# Patient Record
Sex: MALE | Race: WHITE | NOT HISPANIC OR LATINO
[De-identification: names, ages, dates, MRNs, and addresses within clinical notes are randomized per-mention and may not be internally consistent; named-entity substitution may affect disease eponyms.]

---

## 2019-05-17 RX ORDER — ZOSTER VACCINE RECOMBINANT, ADJUVANTED 50 MCG/0.5
50 KIT INTRAMUSCULAR
Qty: 1 | Refills: 0 | Status: ACTIVE | COMMUNITY
Start: 2019-05-17

## 2019-05-20 ENCOUNTER — RECORD ABSTRACTING (OUTPATIENT)
Age: 79
End: 2019-05-20

## 2019-05-20 DIAGNOSIS — Z78.9 OTHER SPECIFIED HEALTH STATUS: ICD-10-CM

## 2019-05-20 DIAGNOSIS — Z23 ENCOUNTER FOR IMMUNIZATION: ICD-10-CM

## 2019-05-20 DIAGNOSIS — Z82.49 FAMILY HISTORY OF ISCHEMIC HEART DISEASE AND OTHER DISEASES OF THE CIRCULATORY SYSTEM: ICD-10-CM

## 2019-05-20 DIAGNOSIS — R94.39 ABNORMAL RESULT OF OTHER CARDIOVASCULAR FUNCTION STUDY: ICD-10-CM

## 2019-05-20 DIAGNOSIS — Z87.891 PERSONAL HISTORY OF NICOTINE DEPENDENCE: ICD-10-CM

## 2019-05-20 DIAGNOSIS — Z87.2 PERSONAL HISTORY OF DISEASES OF THE SKIN AND SUBCUTANEOUS TISSUE: ICD-10-CM

## 2019-05-20 DIAGNOSIS — Z86.39 PERSONAL HISTORY OF OTHER ENDOCRINE, NUTRITIONAL AND METABOLIC DISEASE: ICD-10-CM

## 2019-05-20 DIAGNOSIS — Z87.898 PERSONAL HISTORY OF OTHER SPECIFIED CONDITIONS: ICD-10-CM

## 2019-05-20 DIAGNOSIS — R10.32 LEFT LOWER QUADRANT PAIN: ICD-10-CM

## 2019-05-20 DIAGNOSIS — Z86.79 PERSONAL HISTORY OF OTHER DISEASES OF THE CIRCULATORY SYSTEM: ICD-10-CM

## 2019-05-20 DIAGNOSIS — R01.1 CARDIAC MURMUR, UNSPECIFIED: ICD-10-CM

## 2019-05-20 RX ORDER — ASPIRIN 81 MG
81 TABLET, DELAYED RELEASE (ENTERIC COATED) ORAL DAILY
Refills: 0 | Status: ACTIVE | COMMUNITY

## 2019-05-20 RX ORDER — CHROMIUM 200 MCG
1000 TABLET ORAL
Refills: 0 | Status: ACTIVE | COMMUNITY

## 2019-05-20 RX ORDER — RAMIPRIL 10 MG/1
10 CAPSULE ORAL DAILY
Refills: 0 | Status: ACTIVE | COMMUNITY

## 2019-05-20 RX ORDER — DIAZEPAM 5 MG/1
5 TABLET ORAL
Refills: 0 | Status: ACTIVE | COMMUNITY

## 2019-07-17 ENCOUNTER — APPOINTMENT (OUTPATIENT)
Dept: CARDIOLOGY | Facility: CLINIC | Age: 79
End: 2019-07-17
Payer: MEDICARE

## 2019-07-17 ENCOUNTER — NON-APPOINTMENT (OUTPATIENT)
Age: 79
End: 2019-07-17

## 2019-07-17 VITALS
DIASTOLIC BLOOD PRESSURE: 63 MMHG | HEIGHT: 70 IN | BODY MASS INDEX: 23.05 KG/M2 | HEART RATE: 50 BPM | OXYGEN SATURATION: 98 % | RESPIRATION RATE: 16 BRPM | SYSTOLIC BLOOD PRESSURE: 127 MMHG | WEIGHT: 161 LBS

## 2019-07-17 PROCEDURE — 93000 ELECTROCARDIOGRAM COMPLETE: CPT

## 2019-07-17 PROCEDURE — 99204 OFFICE O/P NEW MOD 45 MIN: CPT

## 2019-07-21 RX ORDER — METOPROLOL SUCCINATE 25 MG/1
25 TABLET, EXTENDED RELEASE ORAL DAILY
Refills: 3 | Status: ACTIVE | COMMUNITY
Start: 2019-07-21

## 2019-07-21 RX ORDER — METOPROLOL TARTRATE 25 MG/1
25 TABLET, FILM COATED ORAL DAILY
Refills: 0 | Status: DISCONTINUED | COMMUNITY
End: 2019-07-21

## 2019-07-21 NOTE — PHYSICAL EXAM
[Well Groomed] : well groomed [General Appearance - In No Acute Distress] : no acute distress [Normal Conjunctiva] : the conjunctiva exhibited no abnormalities [Eyelids - No Xanthelasma] : the eyelids demonstrated no xanthelasmas [Normal Oral Mucosa] : normal oral mucosa [No Oral Pallor] : no oral pallor [No Oral Cyanosis] : no oral cyanosis [Normal Jugular Venous A Waves Present] : normal jugular venous A waves present [Normal Jugular Venous V Waves Present] : normal jugular venous V waves present [No Jugular Venous Dubois A Waves] : no jugular venous dubois A waves [Respiration, Rhythm And Depth] : normal respiratory rhythm and effort [Exaggerated Use Of Accessory Muscles For Inspiration] : no accessory muscle use [Auscultation Breath Sounds / Voice Sounds] : lungs were clear to auscultation bilaterally [Normal Rate] : normal [Rhythm Regular] : regular [Normal S1] : normal S1 [Normal S2] : normal S2 [No Gallop] : no gallop heard [No Murmur] : no murmurs heard [2+] : left 2+ [No Pitting Edema] : no pitting edema present [Abdomen Soft] : soft [Abdomen Tenderness] : non-tender [Abdomen Mass (___ Cm)] : no abdominal mass palpated [Abnormal Walk] : normal gait [Gait - Sufficient For Exercise Testing] : the gait was sufficient for exercise testing [Nail Clubbing] : no clubbing of the fingernails [Cyanosis, Localized] : no localized cyanosis [Petechial Hemorrhages (___cm)] : no petechial hemorrhages [Skin Color & Pigmentation] : normal skin color and pigmentation [] : no rash [No Venous Stasis] : no venous stasis [Skin Lesions] : no skin lesions [No Skin Ulcers] : no skin ulcer [No Xanthoma] : no  xanthoma was observed [Oriented To Time, Place, And Person] : oriented to person, place, and time [Affect] : the affect was normal [Mood] : the mood was normal [No Anxiety] : not feeling anxious [Right Carotid Bruit] : no bruit heard over the right carotid [Left Carotid Bruit] : no bruit heard over the left carotid [Bruit] : no bruit heard

## 2019-07-21 NOTE — DISCUSSION/SUMMARY
[FreeTextEntry1] : 79 year man with a history as listed presents for an initial cardiac evaluation. \par Damien is doing well. He denies any anginal symptoms. Clinically he is euvolemic on exam. His EKG did not reveal any significant ischemic changes. \par Per his records, he had an nuclear stress test unrevealing for ischemia at an outside office in 10/2018. He was able to do 9.7 METS on the Aydin Protocol. \par His blood pressure and heart rate are controlled. He is bradycardic but this appears to be a chronic issue. He was noted to have chronotropic competence on his last stress test. He will continue Toprol 25mg Qday. He will continue Ramipril 10mg Qday. \par He will continue with statin therapy to achieve maintain goal LDL<100 or ideally <70. His last LDL was 97 in 6/2019. He will continue with zocor 40mg HS. Possible switch to Lipitor Crestor in future. \par Exercise and diet counseling was performed in order to reduce her future cardiovascular risk. \par He will followup with me in 2-3 months or sooner if necessary. \par

## 2019-08-19 ENCOUNTER — APPOINTMENT (OUTPATIENT)
Dept: CARDIOLOGY | Facility: CLINIC | Age: 79
End: 2019-08-19

## 2019-10-23 ENCOUNTER — APPOINTMENT (OUTPATIENT)
Dept: CARDIOLOGY | Facility: CLINIC | Age: 79
End: 2019-10-23
Payer: MEDICARE

## 2019-10-23 ENCOUNTER — NON-APPOINTMENT (OUTPATIENT)
Age: 79
End: 2019-10-23

## 2019-10-23 VITALS
WEIGHT: 165 LBS | OXYGEN SATURATION: 100 % | HEIGHT: 70 IN | HEART RATE: 52 BPM | BODY MASS INDEX: 23.62 KG/M2 | DIASTOLIC BLOOD PRESSURE: 62 MMHG | SYSTOLIC BLOOD PRESSURE: 137 MMHG

## 2019-10-23 VITALS — SYSTOLIC BLOOD PRESSURE: 124 MMHG | DIASTOLIC BLOOD PRESSURE: 60 MMHG

## 2019-10-23 DIAGNOSIS — Z00.00 ENCOUNTER FOR GENERAL ADULT MEDICAL EXAMINATION W/OUT ABNORMAL FINDINGS: ICD-10-CM

## 2019-10-23 DIAGNOSIS — M81.0 AGE-RELATED OSTEOPOROSIS W/OUT CURRENT PATHOLOGICAL FRACTURE: ICD-10-CM

## 2019-10-23 PROCEDURE — 99214 OFFICE O/P EST MOD 30 MIN: CPT

## 2019-10-23 PROCEDURE — 93000 ELECTROCARDIOGRAM COMPLETE: CPT

## 2019-10-23 RX ORDER — SIMVASTATIN 40 MG/1
40 TABLET, FILM COATED ORAL DAILY
Refills: 0 | Status: DISCONTINUED | COMMUNITY
End: 2019-10-23

## 2019-10-23 RX ORDER — ALENDRONATE SODIUM 70 MG/1
70 TABLET ORAL
Qty: 12 | Refills: 3 | Status: ACTIVE | COMMUNITY
Start: 2019-10-23

## 2019-10-23 NOTE — DISCUSSION/SUMMARY
[FreeTextEntry1] : 79 year man with a history as listed presents for a followup cardiac evaluation. \par Damien is doing well. He denies any anginal symptoms. Clinically he is euvolemic on exam. His EKG did not reveal any significant ischemic changes. \par Per his records, he had an nuclear stress test unrevealing for ischemia at an outside office in 10/2018. He was able to do 9.7 METS on the Aydin Protocol. \par His blood pressure and heart rate are controlled. He is bradycardic but this appears to be a chronic issue. He was noted to have chronotropic competence on his last stress test. He will continue Toprol 25mg Qday. He will continue Ramipril 10mg Qday. \par He will continue with statin therapy to achieve maintain goal LDL<100 or ideally <70. His last LDL was 97 in 6/2019. I would change his Zocor to Crestor 20mg HS. Recheck lipids in 3 months. \par Exercise and diet counseling was performed in order to reduce her future cardiovascular risk. \par He will followup with me in  3 months or sooner if necessary. \par

## 2019-10-23 NOTE — HISTORY OF PRESENT ILLNESS
[FreeTextEntry1] : 79 year man with history of CAD s/p pLAD s/p BMS 2012, HTN, HLD, BPH, ex tobacco, anxiety. \par \par He is here for a followup visit. \par He is complaining of more back pain. He   denies any chest pain, dyspnea, PND, orthopnea, lower extremity edema, near syncope, syncope. He denies any blurry vision, headaches. He exercises about 5 times a weeks for 35 minutes without any anginal symptoms. \par He is compliant with his medications. \par \par He had an nuclear stress test unrevealing for ischemia at an outside office in 10/2018. He was able to do 9.7 METS on the Aydin Protocol.

## 2019-10-23 NOTE — PHYSICAL EXAM
[General Appearance - In No Acute Distress] : no acute distress [Well Groomed] : well groomed [Normal Conjunctiva] : the conjunctiva exhibited no abnormalities [Eyelids - No Xanthelasma] : the eyelids demonstrated no xanthelasmas [Normal Oral Mucosa] : normal oral mucosa [No Oral Pallor] : no oral pallor [Normal Jugular Venous A Waves Present] : normal jugular venous A waves present [No Oral Cyanosis] : no oral cyanosis [Normal Jugular Venous V Waves Present] : normal jugular venous V waves present [No Jugular Venous Dubois A Waves] : no jugular venous dubois A waves [Exaggerated Use Of Accessory Muscles For Inspiration] : no accessory muscle use [Respiration, Rhythm And Depth] : normal respiratory rhythm and effort [Abdomen Soft] : soft [Auscultation Breath Sounds / Voice Sounds] : lungs were clear to auscultation bilaterally [Abdomen Tenderness] : non-tender [Abdomen Mass (___ Cm)] : no abdominal mass palpated [Abnormal Walk] : normal gait [Gait - Sufficient For Exercise Testing] : the gait was sufficient for exercise testing [Nail Clubbing] : no clubbing of the fingernails [Cyanosis, Localized] : no localized cyanosis [Petechial Hemorrhages (___cm)] : no petechial hemorrhages [Skin Color & Pigmentation] : normal skin color and pigmentation [No Venous Stasis] : no venous stasis [] : no rash [Skin Lesions] : no skin lesions [No Skin Ulcers] : no skin ulcer [No Xanthoma] : no  xanthoma was observed [Oriented To Time, Place, And Person] : oriented to person, place, and time [Affect] : the affect was normal [No Anxiety] : not feeling anxious [Mood] : the mood was normal [Normal Rate] : normal [Rhythm Regular] : regular [Normal S1] : normal S1 [Normal S2] : normal S2 [No Gallop] : no gallop heard [No Murmur] : no murmurs heard [2+] : left 2+ [Right Carotid Bruit] : no bruit heard over the right carotid [Left Carotid Bruit] : no bruit heard over the left carotid [Bruit] : no bruit heard [No Pitting Edema] : no pitting edema present

## 2019-10-25 RX ORDER — ROSUVASTATIN CALCIUM 20 MG/1
20 TABLET, FILM COATED ORAL DAILY
Qty: 30 | Refills: 5 | Status: ACTIVE | COMMUNITY
Start: 2019-10-23 | End: 1900-01-01

## 2019-11-11 ENCOUNTER — APPOINTMENT (OUTPATIENT)
Dept: ORTHOPEDIC SURGERY | Facility: CLINIC | Age: 79
End: 2019-11-11

## 2019-11-19 ENCOUNTER — APPOINTMENT (OUTPATIENT)
Dept: ORTHOPEDIC SURGERY | Facility: CLINIC | Age: 79
End: 2019-11-19
Payer: MEDICARE

## 2019-11-19 VITALS — DIASTOLIC BLOOD PRESSURE: 70 MMHG | HEART RATE: 59 BPM | SYSTOLIC BLOOD PRESSURE: 144 MMHG

## 2019-11-19 VITALS — WEIGHT: 165 LBS | HEIGHT: 70 IN | BODY MASS INDEX: 23.62 KG/M2

## 2019-11-19 PROCEDURE — 99204 OFFICE O/P NEW MOD 45 MIN: CPT

## 2019-11-20 NOTE — HISTORY OF PRESENT ILLNESS
[de-identified] : Patient is here for left knee pain. This has been a chronic issue. He has seen other providers for this issue in the past but overall has done nothing to treat this pain. He recently had xrays which showed end stage arthritis. He is here to discuss what his options are. There has been no recent injury. His pain is progressing and is affecting more ADLs. \par \par The patient's past medical history, past surgical history, medications and allergies were reviewed by me today and documented accordingly. In addition, the patient's family and social history, which were noncontributory to this visit, were reviewed also. The patient has no family history of arthritis.

## 2019-11-20 NOTE — DISCUSSION/SUMMARY
[de-identified] : Discussed findings of today's exam and possible causes of patient's pain.  Educated patient on their most probable diagnosis of chronic left knee pain secondary to severe osteoarthritis most notably of the medial compartment with bone-on-bone degradation.  Reviewed possible courses of treatment, and we collaboratively decided best course of treatment at this time will include conservative management. Patient has long-standing history of worsening knee pain and decreasing overall function, he has severe medial compartment osteoarthritis, however he has not tried very much in the way of conservative management for this issue. After discussing various treatment options patient elected proceed with insurance authorization for hyaluronic acid injection therapy. Patient advised that if he does not have good relief from hyaluronic acid he would be recommended to have a consultation with one of my surgical associates regarding total knee arthroplasty, but he may also have the option of hemiarthroplasty as he primarily has localized medial compartment degeneration, with maintained joint spaces in the patellofemoral and lateral compartments. Surgical decision making would be dependent on surgeon preference and recovery options to be discussed with the patient at that time. Patient is advised that he does not need prior authorization for her hyaluronic acid as he has Medicare part B., but he states he has travel plans for the upcoming holiday, he would like to followup afterwards for hyaluronic acid injection therapy at that time.  Patient appreciates and agrees with current plan.\par \par This note was generated using dragon medical dictation software.  A reasonable effort has been made for proofreading its contents, but typos may still remain.  If there are any questions or points of clarification needed please notify my office.

## 2019-11-20 NOTE — PHYSICAL EXAM
[de-identified] : Constitutional: Well-nourished, well-developed, No acute distress\par Respiratory:  Good respiratory effort, no SOB\par Lymphatic: No regional lymphadenopathy, no lymphedema\par Psychiatric: Pleasant and normal affect, alert and oriented x3\par Skin: Clean dry and intact B/L UE/LE\par Musculoskeletal: normal except where as noted in regional exam\par \par B/L Hips: No asymmetry, malalignment, or swelling, Full ROM, 5/5 strength in flexion/ext, IR/ER, Abd/Add, Joints stable\par B/L Ankles: No asymmetry, malalignment, or swelling, Full ROM, 5/5 strength in DF/PF/Inv/Ev, Joints stable\par \par BIOMECHANICAL EXAM: no marked leg length discrepancy, moderate hip abductor weakness b/l, no marked pes planus or foot pronation, tight hams and ITB b/l.  Normal gait and station\par \par Left Knee:\par APPEARANCE: no marked deformities, no swelling or malalignment\par POSITIVE TENDERNESS:  + crepitus of the anterior knee, and tenderness of patellar retinaculum\par NONTENDER: jt lines b/l, patellar & quadriceps tendons, MCL/LCL, ITB at the lateral femoral condyle & Gerdy's tubercle, pes bursa. \par ROM: full & painless, although some discomfort in deep knee flexion\par RESISTIVE TESTING: + discomfort with knee ext from deep knee flexion (stretched position), painless knee flexion. \par SPECIAL TESTS: stable v/v stress. painless grind. neg Lachman's. neg ant/post drawer. neg Christofer's. neg Thessaly test. neg Luciano's & Malacrae's\par NEURO: Normal sensation of LE, DTRs 2+/4 patella and achilles\par PULSES: 2+ DP/PT pulses\par \par Right Knee:\par APPEARANCE: no marked deformities, no swelling or malalignment\par POSITIVE TENDERNESS:  + crepitus of the anterior knee, and tenderness of patellar retinaculum\par NONTENDER: jt lines b/l, patellar & quadriceps tendons, MCL/LCL, ITB at the lateral femoral condyle & Gerdy's tubercle, pes bursa. \par ROM: full & painless, although some discomfort in deep knee flexion\par RESISTIVE TESTING: + discomfort with knee ext from deep knee flexion (stretched position), painless knee flexion. \par SPECIAL TESTS: stable v/v stress. painless grind. neg Lachman's. neg ant/post drawer. neg Christofer's. neg Thessaly test. neg Luciano's & Malacrae's\par NEURO: Normal sensation of LE, DTRs 2+/4 patella and achilles\par PULSES: 2+ DP/PT pulses\par  [de-identified] : I reviewed and clinically correlated the following outside imaging studies from [facility],\par \par IMPRESSION:\par \par No acute fracture. Severe, end-stage arthrosis within the medial femorotibial compartment with broad region bone on bone apposition, subjacent sclerosis and small marginal osteophyte formation. Genu varus and lateral tibial translation. Relative mild lateral and patellofemoral arthrosis. Small effusion. Atherosclerotic calcifications.\par \par History: Chronic knee pain progressing over the past month\par \par Technique: 3 views left knee\par \par Comparison: None

## 2019-12-23 ENCOUNTER — APPOINTMENT (OUTPATIENT)
Dept: ORTHOPEDIC SURGERY | Facility: CLINIC | Age: 79
End: 2019-12-23

## 2020-01-17 ENCOUNTER — TRANSCRIPTION ENCOUNTER (OUTPATIENT)
Age: 80
End: 2020-01-17

## 2020-03-27 ENCOUNTER — APPOINTMENT (OUTPATIENT)
Dept: CARDIOLOGY | Facility: CLINIC | Age: 80
End: 2020-03-27

## 2020-06-16 ENCOUNTER — NON-APPOINTMENT (OUTPATIENT)
Age: 80
End: 2020-06-16

## 2020-06-16 ENCOUNTER — APPOINTMENT (OUTPATIENT)
Dept: CARDIOLOGY | Facility: CLINIC | Age: 80
End: 2020-06-16
Payer: MEDICARE

## 2020-06-16 VITALS
HEART RATE: 46 BPM | WEIGHT: 161 LBS | BODY MASS INDEX: 23.05 KG/M2 | DIASTOLIC BLOOD PRESSURE: 64 MMHG | SYSTOLIC BLOOD PRESSURE: 130 MMHG | OXYGEN SATURATION: 96 % | HEIGHT: 70 IN

## 2020-06-16 VITALS — SYSTOLIC BLOOD PRESSURE: 118 MMHG | DIASTOLIC BLOOD PRESSURE: 60 MMHG

## 2020-06-16 PROCEDURE — 99214 OFFICE O/P EST MOD 30 MIN: CPT

## 2020-06-16 PROCEDURE — 93000 ELECTROCARDIOGRAM COMPLETE: CPT

## 2020-06-16 NOTE — DISCUSSION/SUMMARY
[FreeTextEntry1] : 79 year man with a history as listed presents for a followup cardiac evaluation. \par Damien is doing well. He denies any anginal symptoms. Clinically he is euvolemic on exam. His EKG did not reveal any significant ischemic changes. \par Per his records, he had an nuclear stress test unrevealing for ischemia at an outside office in 10/2018. He was able to do 9.7 METS on the Aydin Protocol. \par His blood pressure and heart rate are controlled. He is bradycardic but this appears to be a chronic issue. He was noted to have chronotropic competence on his last stress test. He will continue Toprol 25mg Qday. He will continue Valsartan 160mg Qday. \par He will continue with statin therapy to achieve maintain goal LDL<100 or ideally <70.  in 6/2019. He will continue Crestor 20mg HS. \par Exercise and diet counseling was performed in order to reduce her future cardiovascular risk. \par He will followup with me in  3 months or sooner if necessary. \par

## 2020-06-16 NOTE — HISTORY OF PRESENT ILLNESS
[FreeTextEntry1] : 79 year man with history of CAD s/p pLAD s/p BMS 2012, HTN, HLD, BPH, ex tobacco, anxiety. \par \par He is here for a followup visit. \par He has lost about 10lbs which was unexplained over the last few months. He reports that he had labs done that reportedly was normal. He is eating normally. He notes that his weight has stabilized. He is taking a brisk walk about 5 times a week. \par He   denies any chest pain, dyspnea, PND, orthopnea, lower extremity edema, near syncope, syncope. He denies any blurry vision, headaches. \par He is compliant with his medications. \par \par He had an nuclear stress test unrevealing for ischemia at an outside office in 10/2018. He was able to do 9.7 METS on the Aydin Protocol.

## 2020-06-16 NOTE — PHYSICAL EXAM
[Well Groomed] : well groomed [General Appearance - In No Acute Distress] : no acute distress [Normal Conjunctiva] : the conjunctiva exhibited no abnormalities [Eyelids - No Xanthelasma] : the eyelids demonstrated no xanthelasmas [Normal Oral Mucosa] : normal oral mucosa [No Oral Pallor] : no oral pallor [No Oral Cyanosis] : no oral cyanosis [Normal Jugular Venous A Waves Present] : normal jugular venous A waves present [Normal Jugular Venous V Waves Present] : normal jugular venous V waves present [No Jugular Venous Dubois A Waves] : no jugular venous dubois A waves [Respiration, Rhythm And Depth] : normal respiratory rhythm and effort [Exaggerated Use Of Accessory Muscles For Inspiration] : no accessory muscle use [Auscultation Breath Sounds / Voice Sounds] : lungs were clear to auscultation bilaterally [Abdomen Soft] : soft [Abdomen Tenderness] : non-tender [Abdomen Mass (___ Cm)] : no abdominal mass palpated [Abnormal Walk] : normal gait [Gait - Sufficient For Exercise Testing] : the gait was sufficient for exercise testing [Nail Clubbing] : no clubbing of the fingernails [Cyanosis, Localized] : no localized cyanosis [Petechial Hemorrhages (___cm)] : no petechial hemorrhages [Skin Color & Pigmentation] : normal skin color and pigmentation [] : no rash [No Venous Stasis] : no venous stasis [Skin Lesions] : no skin lesions [No Skin Ulcers] : no skin ulcer [No Xanthoma] : no  xanthoma was observed [Affect] : the affect was normal [Oriented To Time, Place, And Person] : oriented to person, place, and time [Mood] : the mood was normal [No Anxiety] : not feeling anxious [Normal Rate] : normal [Normal S1] : normal S1 [Rhythm Regular] : regular [No Murmur] : no murmurs heard [Normal S2] : normal S2 [No Gallop] : no gallop heard [2+] : left 2+ [Left Carotid Bruit] : no bruit heard over the left carotid [Right Carotid Bruit] : no bruit heard over the right carotid [Bruit] : no bruit heard [No Pitting Edema] : no pitting edema present

## 2020-12-16 ENCOUNTER — NON-APPOINTMENT (OUTPATIENT)
Age: 80
End: 2020-12-16

## 2020-12-16 ENCOUNTER — APPOINTMENT (OUTPATIENT)
Dept: CARDIOLOGY | Facility: CLINIC | Age: 80
End: 2020-12-16
Payer: MEDICARE

## 2020-12-16 VITALS
BODY MASS INDEX: 22.05 KG/M2 | SYSTOLIC BLOOD PRESSURE: 117 MMHG | HEIGHT: 70 IN | DIASTOLIC BLOOD PRESSURE: 62 MMHG | HEART RATE: 61 BPM | OXYGEN SATURATION: 100 % | WEIGHT: 154 LBS

## 2020-12-16 PROCEDURE — 99214 OFFICE O/P EST MOD 30 MIN: CPT

## 2020-12-16 PROCEDURE — 93000 ELECTROCARDIOGRAM COMPLETE: CPT

## 2020-12-16 NOTE — PHYSICAL EXAM
[Well Groomed] : well groomed [General Appearance - In No Acute Distress] : no acute distress [Normal Conjunctiva] : the conjunctiva exhibited no abnormalities [Eyelids - No Xanthelasma] : the eyelids demonstrated no xanthelasmas [Normal Oral Mucosa] : normal oral mucosa [No Oral Pallor] : no oral pallor [No Oral Cyanosis] : no oral cyanosis [Normal Jugular Venous A Waves Present] : normal jugular venous A waves present [Normal Jugular Venous V Waves Present] : normal jugular venous V waves present [No Jugular Venous Dubois A Waves] : no jugular venous dubois A waves [Respiration, Rhythm And Depth] : normal respiratory rhythm and effort [Exaggerated Use Of Accessory Muscles For Inspiration] : no accessory muscle use [Auscultation Breath Sounds / Voice Sounds] : lungs were clear to auscultation bilaterally [Abdomen Soft] : soft [Abdomen Tenderness] : non-tender [Abdomen Mass (___ Cm)] : no abdominal mass palpated [Abnormal Walk] : normal gait [Gait - Sufficient For Exercise Testing] : the gait was sufficient for exercise testing [Nail Clubbing] : no clubbing of the fingernails [Cyanosis, Localized] : no localized cyanosis [Petechial Hemorrhages (___cm)] : no petechial hemorrhages [Skin Color & Pigmentation] : normal skin color and pigmentation [] : no rash [No Venous Stasis] : no venous stasis [Skin Lesions] : no skin lesions [No Skin Ulcers] : no skin ulcer [No Xanthoma] : no  xanthoma was observed [Oriented To Time, Place, And Person] : oriented to person, place, and time [Affect] : the affect was normal [Mood] : the mood was normal [No Anxiety] : not feeling anxious [Normal Rate] : normal [Rhythm Regular] : regular [Normal S1] : normal S1 [Normal S2] : normal S2 [No Gallop] : no gallop heard [No Murmur] : no murmurs heard [2+] : left 2+ [Right Carotid Bruit] : no bruit heard over the right carotid [Left Carotid Bruit] : no bruit heard over the left carotid [Bruit] : no bruit heard [No Pitting Edema] : no pitting edema present

## 2020-12-16 NOTE — HISTORY OF PRESENT ILLNESS
[FreeTextEntry1] : 79 year man with history of CAD s/p pLAD s/p BMS 2012, HTN, HLD, BPH, ex tobacco, anxiety. \par \par He is here for a followup visit. \par he contracted COVID with a confirmed diagnosis with PCR on 10/23/20. He  was not hospitalized and given Azithromycin and Dexamethasone. \par He still has a lingering cough. Currently denies dyspnea. He is walking about 2-2.5 miles a day. \par He   denies any chest pain,  PND, orthopnea, lower extremity edema, near syncope, syncope. He denies any blurry vision, headaches. He is eating normally. He notes that his weight has stabilized.  \par He is compliant with his medications. \par \par He had an nuclear stress test unrevealing for ischemia at an outside office in 10/2018. He was able to do 9.7 METS on the Aydin Protocol.

## 2020-12-16 NOTE — DISCUSSION/SUMMARY
[FreeTextEntry1] : 80 year man with a history as listed presents for a followup cardiac evaluation. \par Damien is better after his recent COVID infection.  He denies any anginal symptoms. Clinically he is euvolemic on exam. His EKG did not reveal any significant ischemic changes. He will get a 2d echo to assess for any  new structural heart disease, changes in valvular and ventricular function. \par Per his records, he had an nuclear stress test unrevealing for ischemia at an outside office in 10/2018. He was able to do 9.7 METS on the Aydin Protocol. He will get a nuclear stress in the spring. \par His blood pressure and heart rate are controlled. He is bradycardic but this appears to be a chronic issue. He was noted to have chronotropic competence on his last stress test. He will continue Toprol 25mg Qday. He will continue Valsartan 160mg Qday. \par He will continue with statin therapy to achieve maintain goal LDL<100 or ideally <70.  in 6/2019. He will continue Crestor 20mg HS. \par Exercise and diet counseling was performed in order to reduce her future cardiovascular risk. \par He will followup with me in  3-4 months or sooner if necessary. \par

## 2021-01-08 ENCOUNTER — APPOINTMENT (OUTPATIENT)
Dept: CARDIOLOGY | Facility: CLINIC | Age: 81
End: 2021-01-08
Payer: MEDICARE

## 2021-01-08 PROCEDURE — 93306 TTE W/DOPPLER COMPLETE: CPT

## 2021-01-13 ENCOUNTER — TRANSCRIPTION ENCOUNTER (OUTPATIENT)
Age: 81
End: 2021-01-13

## 2021-06-24 ENCOUNTER — APPOINTMENT (OUTPATIENT)
Dept: CARDIOLOGY | Facility: CLINIC | Age: 81
End: 2021-06-24
Payer: MEDICARE

## 2021-06-24 ENCOUNTER — NON-APPOINTMENT (OUTPATIENT)
Age: 81
End: 2021-06-24

## 2021-06-24 VITALS
BODY MASS INDEX: 22.62 KG/M2 | DIASTOLIC BLOOD PRESSURE: 62 MMHG | WEIGHT: 158 LBS | HEIGHT: 70 IN | OXYGEN SATURATION: 99 % | HEART RATE: 53 BPM | SYSTOLIC BLOOD PRESSURE: 130 MMHG

## 2021-06-24 PROCEDURE — 93000 ELECTROCARDIOGRAM COMPLETE: CPT

## 2021-06-24 PROCEDURE — 99214 OFFICE O/P EST MOD 30 MIN: CPT

## 2021-06-24 NOTE — PHYSICAL EXAM
[Well Groomed] : well groomed [General Appearance - In No Acute Distress] : no acute distress [Normal Conjunctiva] : the conjunctiva exhibited no abnormalities [Eyelids - No Xanthelasma] : the eyelids demonstrated no xanthelasmas [Normal Oral Mucosa] : normal oral mucosa [No Oral Pallor] : no oral pallor [No Oral Cyanosis] : no oral cyanosis [Normal Jugular Venous A Waves Present] : normal jugular venous A waves present [Normal Jugular Venous V Waves Present] : normal jugular venous V waves present [No Jugular Venous Dubois A Waves] : no jugular venous dubois A waves [Respiration, Rhythm And Depth] : normal respiratory rhythm and effort [Exaggerated Use Of Accessory Muscles For Inspiration] : no accessory muscle use [Auscultation Breath Sounds / Voice Sounds] : lungs were clear to auscultation bilaterally [Abdomen Soft] : soft [Abdomen Tenderness] : non-tender [Abdomen Mass (___ Cm)] : no abdominal mass palpated [Abnormal Walk] : normal gait [Gait - Sufficient For Exercise Testing] : the gait was sufficient for exercise testing [Nail Clubbing] : no clubbing of the fingernails [Cyanosis, Localized] : no localized cyanosis [Petechial Hemorrhages (___cm)] : no petechial hemorrhages [Skin Color & Pigmentation] : normal skin color and pigmentation [] : no rash [No Venous Stasis] : no venous stasis [Skin Lesions] : no skin lesions [No Skin Ulcers] : no skin ulcer [No Xanthoma] : no  xanthoma was observed [Oriented To Time, Place, And Person] : oriented to person, place, and time [Affect] : the affect was normal [Mood] : the mood was normal [No Anxiety] : not feeling anxious [Normal Rate] : normal [Rhythm Regular] : regular [Normal S1] : normal S1 [Normal S2] : normal S2 [No Gallop] : no gallop heard [No Murmur] : no murmurs heard [2+] : left 2+ [No Pitting Edema] : no pitting edema present [Right Carotid Bruit] : no bruit heard over the right carotid [Bruit] : no bruit heard [Left Carotid Bruit] : no bruit heard over the left carotid

## 2021-06-24 NOTE — DISCUSSION/SUMMARY
[FreeTextEntry1] : 80 year man with a history as listed presents for a followup cardiac evaluation. \par Damien is doing well. He denies any anginal symptoms. Clinically he is euvolemic on exam. His EKG did not reveal any significant ischemic changes. He will undergo a nuclear stress test to reassess his CAD. \par He will continue Toprol 25mg Qday. He will continue Valsartan 160mg Qday. \par He will continue with statin therapy to achieve maintain goal LDL<100 or ideally <70.  in 6/2019. He will continue Crestor 20mg HS. \par Exercise and diet counseling was performed in order to reduce her future cardiovascular risk. \par He will followup with me in  6 months or sooner if necessary. \par

## 2021-06-24 NOTE — CARDIOLOGY SUMMARY
[___] : [unfilled] [de-identified] : Sinus Bradycardia \par nonspecific T wave changes [de-identified] : He had an nuclear stress test unrevealing for ischemia at an outside office in 10/2018. He was able to do 9.7 METS on the Aydin Protocol.  [de-identified] : 1/2021 normal systolic LV function without any significant other findings, including no significant valvular disease.

## 2021-06-24 NOTE — HISTORY OF PRESENT ILLNESS
[FreeTextEntry1] : 80 year man with history of CAD s/p pLAD s/p BMS 2012, HTN, HLD, BPH, ex tobacco, anxiety. \par \par He is here for a followup visit. \par he contracted COVID with a confirmed diagnosis with PCR on 10/23/20. He  was not hospitalized and given Azithromycin and Dexamethasone. \par He still has a lingering cough. Currently denies dyspnea. He is walking about 2-2.5 miles a day. \par He   denies any chest pain,  PND, orthopnea, lower extremity edema, near syncope, syncope. He denies any blurry vision, headaches. He is eating normally. He notes that his weight has stabilized.  \par He is compliant with his medications. \par

## 2021-08-09 ENCOUNTER — NON-APPOINTMENT (OUTPATIENT)
Age: 81
End: 2021-08-09

## 2021-08-11 ENCOUNTER — APPOINTMENT (OUTPATIENT)
Dept: CARDIOLOGY | Facility: CLINIC | Age: 81
End: 2021-08-11
Payer: MEDICARE

## 2021-08-11 PROCEDURE — 93015 CV STRESS TEST SUPVJ I&R: CPT

## 2021-08-11 PROCEDURE — A9500: CPT

## 2021-08-11 PROCEDURE — 78452 HT MUSCLE IMAGE SPECT MULT: CPT

## 2021-10-14 ENCOUNTER — APPOINTMENT (OUTPATIENT)
Dept: PULMONOLOGY | Facility: CLINIC | Age: 81
End: 2021-10-14
Payer: MEDICARE

## 2021-10-14 VITALS
TEMPERATURE: 97.2 F | SYSTOLIC BLOOD PRESSURE: 120 MMHG | DIASTOLIC BLOOD PRESSURE: 70 MMHG | WEIGHT: 155 LBS | BODY MASS INDEX: 22.96 KG/M2 | HEART RATE: 69 BPM | HEIGHT: 69 IN | OXYGEN SATURATION: 98 % | RESPIRATION RATE: 16 BRPM

## 2021-10-14 DIAGNOSIS — Z87.438 PERSONAL HISTORY OF OTHER DISEASES OF MALE GENITAL ORGANS: ICD-10-CM

## 2021-10-14 DIAGNOSIS — R06.83 SNORING: ICD-10-CM

## 2021-10-14 DIAGNOSIS — Z88.9 ALLERGY STATUS TO UNSPECIFIED DRUGS, MEDICAMENTS AND BIOLOGICAL SUBSTANCES: ICD-10-CM

## 2021-10-14 PROCEDURE — 71046 X-RAY EXAM CHEST 2 VIEWS: CPT

## 2021-10-14 PROCEDURE — 94618 PULMONARY STRESS TESTING: CPT

## 2021-10-14 PROCEDURE — 99204 OFFICE O/P NEW MOD 45 MIN: CPT | Mod: 25

## 2021-10-14 PROCEDURE — ZZZZZ: CPT

## 2021-10-14 NOTE — HISTORY OF PRESENT ILLNESS
[TextBox_4] : Mr. ROSALES is a 81 year male with a history of HTN, allergies HL, CAD, osteoarthritis and osteoporosis, covid-19 infection (11/2020) abnormal CT low vitamin D, anxiety, prior 30+ pack year smoking, who now comes in for an initial pulmonary evaluation. His chief complaint is\par -he notes feeling generally fine \par -he denies dysphagia \par -he denies orthopnea \par -he notes sleeping 7-8 hrs/night \par -he notes getting green light procedure of the prostate BPH \par -he notes snoring \par -he notes not sleeping to a boring tv show \par -she notes neck is 15 \par -he denies pnd \par -he notes allergies in the past \par -he notes coughing after getting covid but that passed \par -he notes he may cough up in the morning to bring up mucous \par - patient denies any headaches, nausea, vomiting, fever, chills, sweats, chest pain, chest pressure, palpitations, coughing, wheezing, fatigue, diarrhea, constipation, dysphagia, myalgias, dizziness, leg swelling, leg pain, itchy eyes, itchy ears, heartburn, reflux or sour taste in the mouth

## 2021-10-14 NOTE — REASON FOR VISIT
[Initial] : an initial visit [TextBox_44] : Mild COPD/asthma/allergies/PND/ abnormal CT/ low vitamin D

## 2021-10-14 NOTE — PROCEDURE
[FreeTextEntry1] : CXR revealed a normal sized heart; there was no evidence of infiltrate or effusion -- A normal appearing chest radiograph \par \par 5/2020 a CT scan shows hyperinflation bronchial wall thickening and scarring in the lungs and mutiple subcm densities, maximum was 7 mm was a minor Hebert; Coronary calcifications and the kidneys \par \par \par CT Scan (10.14.21) bilateral lung nodules measuring up to 5 mm. correlation with any prior CT scans is suggested to confirm stability. otherwise, follo-up examination in 4-6 months is recommended. prominence of the peripheral reticular markings with mild interstitial lung disease. Faint amorphous gg opacities are identified bilaterally best seen on the coronal images which are nonspicifc possibly infectious or inflammatory. Again correlation with any prior outside studies would be helpful otherwise f/o is suggested\par \par 6 minute walk test reveals a low saturation of 95% with slight evidence of dyspnea or fatigue; walked 521.6meters\par   \par \par \par

## 2021-10-14 NOTE — ADDENDUM
[FreeTextEntry1] : Documented by Omari Sam acting as a scribe for Dr. Saul Iyer on (10/14/2021).\par \par All medical record entries made by the Scribe were at my, Dr. Saul Iyer's, direction and personally dictated by me on (10/14/2021). I have reviewed the chart and agree that the record accurately reflects my personal performance of the history, physical exam, assessment and plan. I have also personally directed, reviewed, and agree with the discharge instructions.\par

## 2021-10-14 NOTE — ASSESSMENT
[FreeTextEntry1] : Mr. ROSALES is a 81 year male with a history of HTN, allergies HL, CAD, osteoarthritis and osteoporosis, covid-19 infection (11/2020) abnormal CT low vitamin D, anxiety, prior 30+ pack year smoking, who now comes in for an initial pulmonary evaluation. His chief complaint is Mild COPD/asthma/allergies/PND/ abnormal CT/ low vitamin D\par \par The patient's SOB is felt to be multifactorial:\par -poor mechanics of breathing\par -out of shape/overweight\par -Pulmonary\par -Cardiac (Dr. Plata) \par \par Problem 1: Mild COPD/ asthma \par -Full PFTs to follow \par -Alpha 1 antitrypsin levles \par COPD is a progressive disease and although it cant be cured, appropriate management can slow its progression, reduce frequency and severity of exacerbations, and improve symptoms and the patient quality of life. Hospitalizations are the greatest contributor to the total COPD costs and account for up to 87% of total COPD related costs. Exacerbations are the main cause of admissions and subsequently account for the 40%-75% of COPD costs. Inhaled maintenance therapy reduces the incidence of exacerbations in patients with stable CPPD. Incorrect inhaler use and nonadherence are major obstacles to achieving COPD treatments goals. Many COPD patients have challenges (impaired inhalation, limited dexterity, reduced cognition: that limit their ability to correctly use their COPD treatment devices resulting in reduced symptom control. Of most importance is smoking cessation and early intervention with respiratory illnesses and contemplation for pulmonary rehab to enhance quality of life. - Asthma is believed to be caused by inherited (genetic) and environmental factor, but its exact cause is unknown. asthma may be triggered by allergens, lung infections, or irritants in the air. Asthma triggers are different for each person \par \par Problem 2: history of allergies/ PND \par -Complete blood test to check for allergens \par -Environmental measures for allergies were encouraged including mattress and pillow cover, air purifier, and environmental controls. \par \par Problem 3: Abnormal CT (largest nodule 7mm)  (inflammatory, post-infection, intraparenchymal lymph nodes) \par -Re-review CT scan \par -complete Hypersensitiviy panel and quantiferon gold test \par - CAT scans are the only radiological modality to identify abnormality to identify abnormalities w/in the lings with regards to nodules/masses/lymph nodes. Risks, benefits were reviewed in detail. The guidelines for abnormalities include follow up CT scans at various intervals which could range from 6 weeks to 1 year intervals. If there is a change for the worse then considerations for a biopsy will be considered if you are a candidate. Second opinion evaluation with thoracic surgeon or an interventional radiologist could be offered, \par \par Problem 4: Low Vitamin D\par -Continue vitamin D supplement \par \par Problem 5: Covid-19 infection (11/2020)\par -s/p covid vaccinatation x3 \par - Clean your hands often. Wash your hands often with soap and water for at least 20 seconds, especially after blowing your nose, coughing, or sneezing, or having been in a public place.\par - If soap and water are not available, use a hand  that contains at least 60% alcohol.\par - To the extent possible, avoid touching high-touch surfaces in public places - elevator buttons, door handles, handrails, handshaking with people, etc. Use a tissue or your sleeve to cover your hand or finger if you must touch something.\par - Wash your hands after touching surfaces in public places.\par - Avoid touching your face, nose, eyes, etc.\par - Clean and disinfect your home to remove germs: practice routine cleaning of frequently touched surfaces (for example: tables, doorknobs, light switches, handles, desks, toilets, faucets, sinks & cell phones)\par - Avoid crowds, especially in poorly ventilated spaces. Your risk of exposure to respiratory viruses like COVID-19 may increase in crowded, closed-in settings with little air circulation if there are people in the crowd who are sick. All patients are recommended to practice social distancing and stay at least 6 feet away from others.\par - Avoid all non-essential travel including plane trips, and especially avoid embarking on cruise ships.\par -If COVID-19 is spreading in your community, take extra measures to put distance between yourself and other people to further reduce your risk of being exposed to this new virus.\par -Stay home as much as possible.\par - Consider ways of getting food brought to your house through family, social, or commercial networks\par -Be aware that the virus has been known to live in the air up to 3 hours post exposure, cardboard up to 24 hours post exposure, copper up to 4 hours post exposure, steel and plastic up to 2-3 days post exposure. Risk of transmission from these surfaces are affected by many variables.\par Immune Support Recommendations:\par -OTC Vitamin C 500mg BID \par -OTC Quercetin 250-500mg BID \par -OTC Zinc 75-100mg per day \par -OTC Melatonin 1 or 2 mg a night \par -OTC Vitamin D 1-4000mg per day \par -OTC Tonic Water 8oz per day\par Asthma and COVID19:\par You need to make sure your asthma is under control. This often requires the use of inhaled corticosteroids (and sometimes oral corticosteroids). Inhaled corticosteroids do not likely reduce your immune system’s ability to fight infections, but oral corticosteroids may. It is important to use the steps above to protect yourself to limit your exposure to any respiratory virus. \par \par Problem :Cardiac\par -Recommend cardiac follow up evaluation with cardiologist if needed \par \par Problem :overweight/out of shape\par - Weight loss, exercise and diet control were discussed and are highly encouraged. Treatment options were given such as aqua therapy, and contacting a nutritionist. Recommended to use the elliptical, stationary bike, less use of treadmill. Mindful eating was explained to the patient. Obesity is associated with worsening asthma, SOB, and potential for cardiac disease, diabetes, and other underlying medical conditions.\par \par Problem : Poor mechanics of breathing\par - Proper breathing techniques were reviewed with an emphasis on exhalation. Patient instructed to breath in for 1 second and out for four seconds. Patient was encouraged not to talk while walking.\par \par Problem : Health Maintenance\par -s/p flu shot\par -recommended strep pneumonia vaccines: Prevnar-13 vaccine, follow by Pneumo vaccine 23 one year following\par -recommended early intervention for URIs\par -recommended regular osteoporosis evaluations\par -recommended early dermatological evaluations\par -recommended after the age of 50 to the age of 70, colonoscopy every 5 years\par \par f/u in 6-8 weeks\par pt is encouraged to call or fax the office with any questions or concerns.\par

## 2021-10-27 ENCOUNTER — NON-APPOINTMENT (OUTPATIENT)
Age: 81
End: 2021-10-27

## 2021-11-29 DIAGNOSIS — Z01.812 ENCOUNTER FOR PREPROCEDURAL LABORATORY EXAMINATION: ICD-10-CM

## 2022-01-12 LAB — SARS-COV-2 N GENE NPH QL NAA+PROBE: NOT DETECTED

## 2022-01-13 ENCOUNTER — APPOINTMENT (OUTPATIENT)
Dept: PULMONOLOGY | Facility: CLINIC | Age: 82
End: 2022-01-13
Payer: MEDICARE

## 2022-01-13 ENCOUNTER — NON-APPOINTMENT (OUTPATIENT)
Age: 82
End: 2022-01-13

## 2022-01-13 VITALS
HEIGHT: 69 IN | SYSTOLIC BLOOD PRESSURE: 120 MMHG | DIASTOLIC BLOOD PRESSURE: 60 MMHG | TEMPERATURE: 97.7 F | OXYGEN SATURATION: 98 % | BODY MASS INDEX: 22.96 KG/M2 | WEIGHT: 155 LBS | HEART RATE: 55 BPM | RESPIRATION RATE: 16 BRPM

## 2022-01-13 DIAGNOSIS — F41.9 ANXIETY DISORDER, UNSPECIFIED: ICD-10-CM

## 2022-01-13 DIAGNOSIS — U07.1 COVID-19: ICD-10-CM

## 2022-01-13 DIAGNOSIS — R93.89 ABNORMAL FINDINGS ON DIAGNOSTIC IMAGING OF OTHER SPECIFIED BODY STRUCTURES: ICD-10-CM

## 2022-01-13 DIAGNOSIS — E55.9 VITAMIN D DEFICIENCY, UNSPECIFIED: ICD-10-CM

## 2022-01-13 PROCEDURE — 95012 NITRIC OXIDE EXP GAS DETER: CPT

## 2022-01-13 PROCEDURE — 99214 OFFICE O/P EST MOD 30 MIN: CPT | Mod: CS,25

## 2022-01-13 PROCEDURE — 94010 BREATHING CAPACITY TEST: CPT

## 2022-01-13 RX ORDER — TAMSULOSIN HYDROCHLORIDE 0.4 MG/1
0.4 CAPSULE ORAL
Refills: 0 | Status: DISCONTINUED | COMMUNITY
End: 2022-01-13

## 2022-01-13 NOTE — ADDENDUM
[FreeTextEntry1] : Documented by Omari Sam acting as a scribe for Dr. Saul Iyer on (01/13/2022).\par \par All medical record entries made by the Scribe were at my, Dr. Saul Iyer's, direction and personally dictated by me on (01/13/2022). I have reviewed the chart and agree that the record accurately reflects my personal performance of the history, physical exam, assessment and plan. I have also personally directed, reviewed, and agree with the discharge instructions.\par

## 2022-01-13 NOTE — REASON FOR VISIT
[Follow-Up] : a follow-up visit [TextBox_44] : Mild COPD/asthma/allergies/PND/ abnormal CT/ low vitamin D

## 2022-01-13 NOTE — PROCEDURE
[FreeTextEntry1] : PFT revealed normal flows, with a FEV1 of 3.06L, which is 112% of predicted, with a normal flow volume loop\par  \par Feno was 6; a normal value being less than 25. Fractional exhaled nitric oxide (FENO) is regarded as a simple, noninvasive method for assessing eosinophilic airway inflammation. Produced by a variety of cells within the lung, nitric oxide (NO) concentrations are generally low in healthy individuals. However, high concentrations of NO appear to be involved in nonspecific host defense mechanisms and chronic inflammatory  diseases such as asthma. The American Thoracic Society (ATS) therefore recommended using FENO to aid in the diagnosis and monitoring of eosinophilic airway inflammation and asthma, and for identifying steroid responsive individuals whose chronic respiratory symptoms may be caused by airway inflammation

## 2022-01-13 NOTE — HISTORY OF PRESENT ILLNESS
[TextBox_4] : Mr. ROSALES is a 81 year male with a history of HTN, allergies HL, CAD, osteoarthritis and osteoporosis, covid-19 infection (11/2020) abnormal CT low vitamin D, anxiety, prior 30+ pack year smoking, who now comes in for an f/p pulmonary evaluation. His chief complaint is\par -he notes exercising and lifting \par -he notes stable weight \par -he denies reflux \par -he denies muscle aches and pains \par -he notes sleeping well \par -he notes sleeping 7hrs/night \par -he notes snoring a little \par -he notes no visual issues \par -he notes being on a rx for osteoporosis \par patient denies any headaches, nausea, vomiting, fever, chills, sweats, chest pain, chest pressure, palpitations, coughing, wheezing, fatigue, diarrhea, constipation, dysphagia, myalgias, dizziness, leg swelling, leg pain, itchy eyes, itchy ears, heartburn, reflux or sour taste in the mouth

## 2022-02-23 ENCOUNTER — OUTPATIENT (OUTPATIENT)
Dept: OUTPATIENT SERVICES | Facility: HOSPITAL | Age: 82
LOS: 1 days | End: 2022-02-23
Payer: MEDICARE

## 2022-02-23 ENCOUNTER — APPOINTMENT (OUTPATIENT)
Dept: CT IMAGING | Facility: CLINIC | Age: 82
End: 2022-02-23
Payer: MEDICARE

## 2022-02-23 DIAGNOSIS — R93.89 ABNORMAL FINDINGS ON DIAGNOSTIC IMAGING OF OTHER SPECIFIED BODY STRUCTURES: ICD-10-CM

## 2022-02-23 PROCEDURE — 71250 CT THORAX DX C-: CPT | Mod: MG

## 2022-02-23 PROCEDURE — G1004: CPT

## 2022-02-23 PROCEDURE — 71250 CT THORAX DX C-: CPT | Mod: 26,MG

## 2022-02-28 ENCOUNTER — NON-APPOINTMENT (OUTPATIENT)
Age: 82
End: 2022-02-28

## 2022-03-31 ENCOUNTER — APPOINTMENT (OUTPATIENT)
Dept: CARDIOLOGY | Facility: CLINIC | Age: 82
End: 2022-03-31

## 2022-05-26 ENCOUNTER — APPOINTMENT (OUTPATIENT)
Dept: PULMONOLOGY | Facility: CLINIC | Age: 82
End: 2022-05-26

## 2022-07-14 ENCOUNTER — NON-APPOINTMENT (OUTPATIENT)
Age: 82
End: 2022-07-14

## 2022-07-14 ENCOUNTER — APPOINTMENT (OUTPATIENT)
Dept: CARDIOLOGY | Facility: CLINIC | Age: 82
End: 2022-07-14

## 2022-07-14 VITALS
DIASTOLIC BLOOD PRESSURE: 84 MMHG | SYSTOLIC BLOOD PRESSURE: 142 MMHG | HEIGHT: 69 IN | OXYGEN SATURATION: 99 % | HEART RATE: 56 BPM | WEIGHT: 160 LBS | BODY MASS INDEX: 23.7 KG/M2

## 2022-07-14 DIAGNOSIS — R00.0 TACHYCARDIA, UNSPECIFIED: ICD-10-CM

## 2022-07-14 PROCEDURE — 99214 OFFICE O/P EST MOD 30 MIN: CPT

## 2022-07-14 PROCEDURE — 93000 ELECTROCARDIOGRAM COMPLETE: CPT

## 2022-07-14 NOTE — CARDIOLOGY SUMMARY
[de-identified] : Sinus Bradycardia PACs\par   [de-identified] :  8/2021 9MET Good normal SPECT  [de-identified] : 1/2021 normal systolic LV function without any significant other findings, including no significant valvular disease.

## 2022-07-14 NOTE — DISCUSSION/SUMMARY
[FreeTextEntry1] : 82 year man with a history as listed presents for a followup cardiac evaluation. \par Damien is doing well. He denies any anginal symptoms. Clinically he is euvolemic on exam. His EKG did not reveal any significant ischemic changes. \par His blood pressure is mildly elevated today. He will continue Toprol 25mg Qday. He will continue Valsartan 160mg Qday. He will check his Bp for 2 weeks daily and report back. \par His apple watch showed abnormal HR episodes. He will get a Zio Patch to rule out arrhythmias. \par He will continue with statin therapy to achieve maintain goal LDL<100 or ideally <70.  in 6/2019. He will continue Crestor 20mg HS. \par Exercise and diet counseling was performed in order to reduce her future cardiovascular risk. \par He will followup with me in  6 months or sooner if necessary. \par

## 2022-07-14 NOTE — HISTORY OF PRESENT ILLNESS
[FreeTextEntry1] : 82 year man with history of CAD s/p pLAD s/p BMS 2012, HTN, HLD, BPH, ex tobacco, anxiety. \par \par He is here for a followup visit. \par He is noted to have an initial increased heart rate to 180s when starting the elliptical which then settles down and increases appropriately for the duration of the rest of the exercise.  Though  when interrogating his Apple watch his HR is high at time that he is not at the gym. He intermittently will feel palpitations when stressed but otherwise negative. He   denies any chest pain,  PND, orthopnea, lower extremity edema, near syncope, syncope. He denies any blurry vision, headaches. He is eating normally. He notes that his weight has stabilized.  \par He is compliant with his medications. \par

## 2022-07-25 ENCOUNTER — NON-APPOINTMENT (OUTPATIENT)
Age: 82
End: 2022-07-25

## 2022-08-04 ENCOUNTER — TRANSCRIPTION ENCOUNTER (OUTPATIENT)
Age: 82
End: 2022-08-04

## 2022-08-05 ENCOUNTER — TRANSCRIPTION ENCOUNTER (OUTPATIENT)
Age: 82
End: 2022-08-05

## 2022-08-10 ENCOUNTER — TRANSCRIPTION ENCOUNTER (OUTPATIENT)
Age: 82
End: 2022-08-10

## 2022-08-11 ENCOUNTER — TRANSCRIPTION ENCOUNTER (OUTPATIENT)
Age: 82
End: 2022-08-11

## 2022-08-17 ENCOUNTER — APPOINTMENT (OUTPATIENT)
Dept: CARDIOLOGY | Facility: CLINIC | Age: 82
End: 2022-08-17

## 2022-08-17 PROCEDURE — 93306 TTE W/DOPPLER COMPLETE: CPT

## 2022-08-24 ENCOUNTER — TRANSCRIPTION ENCOUNTER (OUTPATIENT)
Age: 82
End: 2022-08-24

## 2022-09-09 ENCOUNTER — TRANSCRIPTION ENCOUNTER (OUTPATIENT)
Age: 82
End: 2022-09-09

## 2022-09-15 ENCOUNTER — TRANSCRIPTION ENCOUNTER (OUTPATIENT)
Age: 82
End: 2022-09-15

## 2022-09-19 ENCOUNTER — TRANSCRIPTION ENCOUNTER (OUTPATIENT)
Age: 82
End: 2022-09-19

## 2022-09-21 ENCOUNTER — APPOINTMENT (OUTPATIENT)
Dept: ORTHOPEDIC SURGERY | Facility: CLINIC | Age: 82
End: 2022-09-21

## 2022-09-21 PROCEDURE — 99214 OFFICE O/P EST MOD 30 MIN: CPT

## 2022-09-21 NOTE — PHYSICAL EXAM
[de-identified] : Constitutional: Well-nourished, well-developed, No acute distress\par Respiratory:  Good respiratory effort, no SOB\par Lymphatic: No regional lymphadenopathy, no lymphedema\par Psychiatric: Pleasant and normal affect, alert and oriented x3\par Musculoskeletal: normal except where as noted in regional exam\par \par Left knee:\par APPEARANCE: + enlargement of the distal femur and proximal tibia, varus deformity, minimal swelling\par POSITIVE TENDERNESS:  Distal femur, proximal tibia, medial jt line/retinaculum, and lateral jt line/retinaculum\par NONTENDER: patellar & quadriceps tendons, MCL/LCL, ITB at the lateral femoral condyle & Gerdy's tubercle, pes bursa. \par ROM: full extension, limited flexion to 120° due to stiffness and pain. \par RESISTIVE TESTING: painless resisted knee flex/ext, although + crepitus felt in anterior knee. \par SPECIAL TESTS: stable v/v stress. painless grind. neg ant/post drawer. + Christofer's for pain in medial and lateral joint line. \par

## 2022-09-21 NOTE — HISTORY OF PRESENT ILLNESS
[de-identified] : Patient is here for left knee pain follow up. Patient has been back and forth between providers and at one point was scheduled for surgery but deferred. There has been no recent injury. He is interested in pursuing non operative treatment options.

## 2022-09-21 NOTE — DISCUSSION/SUMMARY
[de-identified] : Patient was seen today for reevaluation of chronic intermittent left knee pain with recent atraumatic exacerbation due to severe bone-on-bone osteoarthritis of the medial compartment.  Patient recently had outpatient x-rays performed at [facility] for \[facility]\"" surgery, he brings a report with him but no images.  The report confirms the same findings he had at last visit in 2019 where he had severe end-stage osteoarthritis with bone-on-bone contact of the medial compartment.  Patient is advised that based on his history and clinical exam as well as x-ray findings he is a candidate for total knee replacement surgery.  This is likely the only means of permanent pain relief.  However, patient is not ready for this level of surgical intervention, he would like to explore nonsurgical treatment options.  I advised the patient that we can consider intra-articular cortisone injection, long-acting Zilretta injection, or possibly hyaluronic acid injection therapy.  He would like to proceed with HA injection therapy as most potential long-term treatment option.  He is advised this is preapproved under his insurance Medicare part B plan.  However, he has plans to see his son tomorrow and they will be walking around [facility].  He is advised that there can be knee stiffness for 2-3 days after receiving a gel injection, he would like to defer injection but may follow-up next week for this injection therapy.  Patient appreciates and agrees with current plan.\par \par I work as part of an academic orthopedic group and routinely have a physician in training (resident / fellow) working with me.  Any part of the history and physical exam performed by the physician in training was either directly reviewed and/or replicated by myself.  Any procedure performed by the physician in training was performed under my direct supervision and with the consent of the patient.\par \par This note was generated using dragon medical dictation software.  A reasonable effort has been made for proofreading its contents, but typos may still remain.  If there are any questions or points of clarification needed please notify my office.

## 2022-10-03 ENCOUNTER — APPOINTMENT (OUTPATIENT)
Dept: ORTHOPEDIC SURGERY | Facility: CLINIC | Age: 82
End: 2022-10-03

## 2022-10-03 DIAGNOSIS — M17.12 UNILATERAL PRIMARY OSTEOARTHRITIS, LEFT KNEE: ICD-10-CM

## 2022-10-03 PROCEDURE — 99214 OFFICE O/P EST MOD 30 MIN: CPT | Mod: 25

## 2022-10-03 PROCEDURE — 20610 DRAIN/INJ JOINT/BURSA W/O US: CPT | Mod: LT

## 2022-10-06 ENCOUNTER — APPOINTMENT (OUTPATIENT)
Dept: CARDIOLOGY | Facility: CLINIC | Age: 82
End: 2022-10-06

## 2022-10-06 ENCOUNTER — NON-APPOINTMENT (OUTPATIENT)
Age: 82
End: 2022-10-06

## 2022-10-06 VITALS
BODY MASS INDEX: 23.85 KG/M2 | DIASTOLIC BLOOD PRESSURE: 70 MMHG | SYSTOLIC BLOOD PRESSURE: 123 MMHG | OXYGEN SATURATION: 99 % | HEART RATE: 56 BPM | HEIGHT: 69 IN | WEIGHT: 161 LBS

## 2022-10-06 PROCEDURE — 99214 OFFICE O/P EST MOD 30 MIN: CPT

## 2022-10-06 PROCEDURE — 93000 ELECTROCARDIOGRAM COMPLETE: CPT

## 2022-10-07 NOTE — HISTORY OF PRESENT ILLNESS
[FreeTextEntry1] : 82 year man with history of CAD s/p pLAD s/p BMS 2012, HTN, HLD, BPH, ex tobacco, anxiety. \par \par He is here for a followup visit. \par he is doing relatively well.  He denies any palpitations.  He   denies any chest pain,  PND, orthopnea, lower extremity edema, near syncope, syncope. He denies any blurry vision, headaches. He is eating normally. He notes that his weight has stabilized.  His home BP is 115-130/50-60 \par He is compliant with his medications. \par

## 2022-10-07 NOTE — DISCUSSION/SUMMARY
[FreeTextEntry1] : 82 year man with a history as listed presents for a followup cardiac evaluation. \par Damien is doing well. He denies any anginal symptoms. Clinically he is euvolemic on exam. His EKG did not reveal any significant ischemic changes. \par His blood pressure is controlled He will continue Toprol 25mg Qday. He will continue Valsartan 160mg Qday. He will try to maintain a BP log at home. Reducing dietary salt intake advised.  \par His apple watch showed abnormal HR episodes. He will get a Zio Patch showed short PAt with occasional APCs. HIs avg HR is in the 50s. Will defer medication change.\par He will continue with statin therapy to achieve maintain goal LDL<100 or ideally <70.  in 6/2019. He will continue Crestor 20mg HS. \par Exercise and diet counseling was performed in order to reduce her future cardiovascular risk. \par He will followup with me in  6 months or sooner if necessary. \par

## 2022-10-07 NOTE — PHYSICAL EXAM
[Well Groomed] : well groomed [General Appearance - In No Acute Distress] : no acute distress [Normal Conjunctiva] : the conjunctiva exhibited no abnormalities [Eyelids - No Xanthelasma] : the eyelids demonstrated no xanthelasmas [Normal Oral Mucosa] : normal oral mucosa [No Oral Pallor] : no oral pallor [No Oral Cyanosis] : no oral cyanosis [Normal Jugular Venous A Waves Present] : normal jugular venous A waves present [Normal Jugular Venous V Waves Present] : normal jugular venous V waves present [No Jugular Venous Dubois A Waves] : no jugular venous dubois A waves [Respiration, Rhythm And Depth] : normal respiratory rhythm and effort [Exaggerated Use Of Accessory Muscles For Inspiration] : no accessory muscle use [Auscultation Breath Sounds / Voice Sounds] : lungs were clear to auscultation bilaterally [Abdomen Soft] : soft [Abdomen Tenderness] : non-tender [Abdomen Mass (___ Cm)] : no abdominal mass palpated [Abnormal Walk] : normal gait [Gait - Sufficient For Exercise Testing] : the gait was sufficient for exercise testing [Nail Clubbing] : no clubbing of the fingernails [Cyanosis, Localized] : no localized cyanosis [Petechial Hemorrhages (___cm)] : no petechial hemorrhages [Skin Color & Pigmentation] : normal skin color and pigmentation [] : no rash [No Venous Stasis] : no venous stasis [Skin Lesions] : no skin lesions [No Skin Ulcers] : no skin ulcer [No Xanthoma] : no  xanthoma was observed [Oriented To Time, Place, And Person] : oriented to person, place, and time [Affect] : the affect was normal [Mood] : the mood was normal [No Anxiety] : not feeling anxious [Normal Rate] : normal [Rhythm Regular] : regular [Normal S1] : normal S1 [Normal S2] : normal S2 [No Gallop] : no gallop heard [No Murmur] : no murmurs heard [2+] : left 2+ [No Pitting Edema] : no pitting edema present [Right Carotid Bruit] : no bruit heard over the right carotid [Left Carotid Bruit] : no bruit heard over the left carotid [Bruit] : no bruit heard

## 2022-10-07 NOTE — CARDIOLOGY SUMMARY
[de-identified] : Sinus Bradycardia PACs\par   [de-identified] : 9/30/22 SR with 5 runs PAT longest 5 beats [de-identified] :  8/2021 9MET Good normal SPECT  [de-identified] : 1/2021 normal systolic LV function without any significant other findings, including no significant valvular disease.

## 2022-11-09 NOTE — END OF VISIT
Addended by: NICHOLAS MORRISON on: 11/9/2022 02:37 PM     Modules accepted: Orders    
[Time Spent: ___ minutes] : I have spent [unfilled] minutes of time on the encounter.

## 2023-02-20 ENCOUNTER — APPOINTMENT (OUTPATIENT)
Dept: CT IMAGING | Facility: CLINIC | Age: 83
End: 2023-02-20
Payer: MEDICARE

## 2023-02-20 ENCOUNTER — OUTPATIENT (OUTPATIENT)
Dept: OUTPATIENT SERVICES | Facility: HOSPITAL | Age: 83
LOS: 1 days | End: 2023-02-20
Payer: MEDICARE

## 2023-02-20 DIAGNOSIS — R93.89 ABNORMAL FINDINGS ON DIAGNOSTIC IMAGING OF OTHER SPECIFIED BODY STRUCTURES: ICD-10-CM

## 2023-02-20 PROCEDURE — 71250 CT THORAX DX C-: CPT

## 2023-02-20 PROCEDURE — 71250 CT THORAX DX C-: CPT | Mod: 26,MH

## 2023-02-28 ENCOUNTER — NON-APPOINTMENT (OUTPATIENT)
Age: 83
End: 2023-02-28

## 2023-04-05 ENCOUNTER — APPOINTMENT (OUTPATIENT)
Dept: CARDIOLOGY | Facility: CLINIC | Age: 83
End: 2023-04-05
Payer: MEDICARE

## 2023-04-05 ENCOUNTER — NON-APPOINTMENT (OUTPATIENT)
Age: 83
End: 2023-04-05

## 2023-04-05 VITALS — DIASTOLIC BLOOD PRESSURE: 70 MMHG | SYSTOLIC BLOOD PRESSURE: 108 MMHG

## 2023-04-05 VITALS
HEIGHT: 69 IN | OXYGEN SATURATION: 100 % | SYSTOLIC BLOOD PRESSURE: 137 MMHG | DIASTOLIC BLOOD PRESSURE: 72 MMHG | BODY MASS INDEX: 23.55 KG/M2 | WEIGHT: 159 LBS | HEART RATE: 54 BPM

## 2023-04-05 DIAGNOSIS — I25.10 ATHEROSCLEROTIC HEART DISEASE OF NATIVE CORONARY ARTERY W/OUT ANGINA PECTORIS: ICD-10-CM

## 2023-04-05 DIAGNOSIS — I10 ESSENTIAL (PRIMARY) HYPERTENSION: ICD-10-CM

## 2023-04-05 DIAGNOSIS — E78.5 HYPERLIPIDEMIA, UNSPECIFIED: ICD-10-CM

## 2023-04-05 PROCEDURE — 93000 ELECTROCARDIOGRAM COMPLETE: CPT

## 2023-04-05 PROCEDURE — 99214 OFFICE O/P EST MOD 30 MIN: CPT

## 2023-04-05 NOTE — DISCUSSION/SUMMARY
[FreeTextEntry1] : 82 year man with a history as listed presents for a followup cardiac evaluation. \par Damien is doing well. He denies any anginal symptoms. Clinically he is euvolemic on exam. His EKG did not reveal any significant ischemic changes. \par His blood pressure is controlled He will continue Toprol 25mg Qday. He will continue Valsartan 160mg Qday. He will try to maintain a BP log at home. Reducing dietary salt intake advised.  \par His apple watch showed abnormal HR episodes. He will get a Zio Patch showed short PAt with occasional APCs. HIs avg HR is in the 50s. Will defer medication change.\par He will continue with statin therapy to achieve maintain goal LDL<100 or ideally <70.. He will continue Crestor 20mg HS. \par At your convenience, please fax me his latest lab results including lipid profile. \par Exercise and diet counseling was performed in order to reduce her future cardiovascular risk. \par He will followup with me in  6 months or sooner if necessary. \par  [EKG obtained to assist in diagnosis and management of assessed problem(s)] : EKG obtained to assist in diagnosis and management of assessed problem(s)

## 2023-04-05 NOTE — CARDIOLOGY SUMMARY
[de-identified] : Sinus Bradycardia PACs\par   [de-identified] : 9/30/22 SR with 5 runs PAT longest 5 beats [de-identified] :  8/2021 9MET Good normal SPECT  [de-identified] : 1/2021 normal systolic LV function without any significant other findings, including no significant valvular disease.

## 2023-04-05 NOTE — PHYSICAL EXAM
[Well Groomed] : well groomed [General Appearance - In No Acute Distress] : no acute distress [Normal Conjunctiva] : the conjunctiva exhibited no abnormalities [Eyelids - No Xanthelasma] : the eyelids demonstrated no xanthelasmas [Normal Oral Mucosa] : normal oral mucosa [No Oral Pallor] : no oral pallor [No Oral Cyanosis] : no oral cyanosis [Normal Jugular Venous A Waves Present] : normal jugular venous A waves present [Normal Jugular Venous V Waves Present] : normal jugular venous V waves present [No Jugular Venous Dubois A Waves] : no jugular venous dubois A waves [Respiration, Rhythm And Depth] : normal respiratory rhythm and effort [Exaggerated Use Of Accessory Muscles For Inspiration] : no accessory muscle use [Auscultation Breath Sounds / Voice Sounds] : lungs were clear to auscultation bilaterally [Abdomen Soft] : soft [Abdomen Tenderness] : non-tender [Abnormal Walk] : normal gait [Abdomen Mass (___ Cm)] : no abdominal mass palpated [Gait - Sufficient For Exercise Testing] : the gait was sufficient for exercise testing [Nail Clubbing] : no clubbing of the fingernails [Cyanosis, Localized] : no localized cyanosis [Petechial Hemorrhages (___cm)] : no petechial hemorrhages [Skin Color & Pigmentation] : normal skin color and pigmentation [] : no rash [No Venous Stasis] : no venous stasis [Skin Lesions] : no skin lesions [No Skin Ulcers] : no skin ulcer [No Xanthoma] : no  xanthoma was observed [Oriented To Time, Place, And Person] : oriented to person, place, and time [Affect] : the affect was normal [Mood] : the mood was normal [No Anxiety] : not feeling anxious [Normal Rate] : normal [Rhythm Regular] : regular [Normal S1] : normal S1 [Normal S2] : normal S2 [No Gallop] : no gallop heard [No Murmur] : no murmurs heard [2+] : left 2+ [Right Carotid Bruit] : no bruit heard over the right carotid [Left Carotid Bruit] : no bruit heard over the left carotid [Bruit] : no bruit heard [No Pitting Edema] : no pitting edema present

## 2023-04-05 NOTE — HISTORY OF PRESENT ILLNESS
[FreeTextEntry1] : 82 year man with history of CAD s/p pLAD s/p BMS 2012, HTN, HLD, BPH, osteoporosis,  ex tobacco, anxiety. \par \par He is here for a followup visit. \par he is doing relatively well.  He denies any palpitations.  He   denies any chest pain,  PND, orthopnea, lower extremity edema, near syncope, syncope. He denies any blurry vision, headaches. He is eating normally. He notes that his weight has stabilized.  His home BP is 115-130/50-60 \par He is compliant with his medications. \par

## 2023-04-22 ENCOUNTER — APPOINTMENT (OUTPATIENT)
Dept: ORTHOPEDIC SURGERY | Facility: CLINIC | Age: 83
End: 2023-04-22
Payer: MEDICARE

## 2023-04-22 VITALS
BODY MASS INDEX: 22.96 KG/M2 | WEIGHT: 155 LBS | TEMPERATURE: 98 F | HEART RATE: 50 BPM | OXYGEN SATURATION: 100 % | HEIGHT: 69 IN

## 2023-04-22 DIAGNOSIS — S29.011A STRAIN OF MUSCLE AND TENDON OF FRONT WALL OF THORAX, INITIAL ENCOUNTER: ICD-10-CM

## 2023-04-22 PROCEDURE — 99213 OFFICE O/P EST LOW 20 MIN: CPT

## 2023-04-22 NOTE — PHYSICAL EXAM
[de-identified] : Left Shoulder/Chest: No obvious deformities, atrophy, ecchymosis, or swelling/effusion. Negative tenderness to palpation of AC joint, clavicle, sternoclavicular joint, glenohumeral joint, rotator cuff, and proximal biceps/triceps. Positive tenderness to palpation of left pectoralis muscle. Negative tenderness to pectoralis tendon. Normal active and passive range of motion, including flexion to 180 degrees, extension to 180 degrees, and internal/external rotation to 180 degrees. Negative provocative testing, including [place]th, Neer's, Cross-Body Adduction, [place]'s, Speeds tests, Drop Arm, Empty Can, and Lift Off tests. Neurovascular status is intact.\par \par Otherwise, no apparent distress. Alert and oriented x 3. Normal mood and affect. No atrophy or swelling. 5 out of 5 motor strength. Sensation is intact and symmetrical. Normal deep tendon reflexes. Full range of motion of shoulder, elbows, hips, and knees (flexion, extension, and rotation). No palpatory tenderness or palpable lymph nodes. Negative straight leg raise. Normal finger-to-nose test. No pathological reflexes. Normal ambulation. No upper or lower extremity instability. [de-identified] : Patient and his wife declined x-rays at this time..

## 2023-04-22 NOTE — HISTORY OF PRESENT ILLNESS
[de-identified] : Mr. RADHA ROSALES is a 82 year male who presents to office complaining of left pectoralis pain s/p fall on 4/17/23.\par He was tripped by a dog and fell towards his left side, perhaps landing on somewhat of an outstretched hand, but also isn't sure since it "happened fast".\par He says he did not feel immediate pain, but upon waking up the next morning, felt some discomfort in his left pectoralis muscle.\par He says he was able to use the elliptical machine at that time without pain or issue.\par Denies current left shoulder pain, instability, or weakness. Denies any arm numbness, tingling, or weakness.\par He has rested and taken Advil PRN, which helps to a degree.\par All review of systems, family history, social history, surgical history, past medical history, medications, and allergies not previously stated as positive are negative. They were reviewed by me today with the patient and documented accordingly.

## 2023-04-22 NOTE — DISCUSSION/SUMMARY
[de-identified] : Educated patient and his wife on importance of rest, ice, and OTC NSAIDs \par Advised on gradual return to gym/sports activities\par RTO PRN if pain persists\par All options discussed with patient and his wife\par All questions by them were answered to their satisfaction\par They express full understanding and agreement with plan\par they are both very happy with the office visit

## 2023-06-05 ENCOUNTER — TRANSCRIPTION ENCOUNTER (OUTPATIENT)
Age: 83
End: 2023-06-05

## 2023-10-04 ENCOUNTER — APPOINTMENT (OUTPATIENT)
Dept: CARDIOLOGY | Facility: CLINIC | Age: 83
End: 2023-10-04